# Patient Record
Sex: FEMALE | Race: WHITE | NOT HISPANIC OR LATINO | Employment: FULL TIME | URBAN - METROPOLITAN AREA
[De-identification: names, ages, dates, MRNs, and addresses within clinical notes are randomized per-mention and may not be internally consistent; named-entity substitution may affect disease eponyms.]

---

## 2018-12-13 ENCOUNTER — OFFICE VISIT (OUTPATIENT)
Dept: BEHAVIORAL HEALTH | Facility: CLINIC | Age: 28
End: 2018-12-13
Payer: COMMERCIAL

## 2018-12-13 DIAGNOSIS — F43.23 ADJUSTMENT DISORDER WITH MIXED ANXIETY AND DEPRESSED MOOD: ICD-10-CM

## 2018-12-13 PROCEDURE — 90791 PSYCH DIAGNOSTIC EVALUATION: CPT | Performed by: PSYCHOLOGIST

## 2018-12-13 NOTE — BH THERAPY
[facility]  INITIAL ASSESSMENT    Name: Lisy Jean Baptiste  MRN: [id number]  : 1990  Age: 28 y.o.  Date of assessment: 2018  PCP: Alan Muller M.D.  Persons in attendance: Patient  Total session time: 50 minutes  Confidentiality Reviewed    CHIEF COMPLAINT AND HISTORY OF PRESENTING PROBLEM:  (as stated by Patient): anxiety , I have a lump in  My throat  Lisy Jean Baptiste is a 28 y.o., White female referred for assessment by No ref. provider found.  Primary presenting issue includes anxiety and looking for coping skills    Client born and raised in [place] by mom and dad, dad since .   sibship and close with family.  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    With partner for 5 years, lived together 4 years and marrying in 2019.      Denies substance use, etoh is minimal, never tried illicit subs, denied legal entanglements.    MH hx is minimal, felt depressed two years ago and began Wellbutrin, feels pretty stable on same.  Just began EAP for anxiety as [facility] apt was way out there.  She is working with local therapist.   Kept this apt for addition coping skills.      Is working with MD to verify lump in her throat is not physical.  Been told might be related to anxiety.    Provided psycho ed in breathing and grounding, suggested gratitude journal.     FAMILY/SOCIAL HISTORY  Current living situation/household members: fiance   Relevant family history/structure/dynamics: traditiona, father passed  Current family/social stressors: work, wedding  Quality/quantity of current family and/or social support: good friends family, fiance  Does patient/parent report a family history of behavioral health issues, diagnoses, or treatment? No  No family history on file.     BEHAVIORAL HEALTH TREATMENT HISTORY  Does patient/parent report a history of prior behavioral health treatment for patient? No:    History of untreated  behavioral health issues identified? No    Psychometric Test Results:       MEDICAL HISTORY    Past medical/surgical history: are medical issues contributing to mental health concerns: jpossible thyroid issues?      Medication Allergies: see file  Patient has no allergy information on record.   Medical history provided by patient during current evaluation: not reviewed:  see file     Patient reports last physical exam: not reviewed: see file  Does patient/parent report any history of or current developmental concerns? No  Does patient/parent report nutritional concerns? No  Does patient/parent report change in appetite or weight loss/gain? No  Does patient/parent report history of eating disorder symptoms? No    EDUCATIONAL/LEARNING HISTORY  Is patient currently enrolled in a school/educational program?   No:   Highest grade level completed: 16  School performance/functioning: na  History of Special Education/repeated grades/learning issues: no  Preferred learning style: not reviewee  Current learning needs (large print, language barrier, etc):  n      EMPLOYMENT/RESOURCES  Is the patient currently employed? Yes  Does the patient/parent report adequate financial resources? Yes  Does patient identify impact of presenting issue on work functioning? No  Work or income-related stressors:  n       HISTORY:  Does patient report current or past enlistment? No    [If yes, complete below items]    SPIRITUAL/CULTURAL/IDENTITY:  What are the patient’s/family’s spiritual beliefs or practices? Not reviewed  What is the patient’s cultural or ethnic background/identity? Not reviewed  How does the patient identify their sexual orientation? hetero  How does the patient identify their gender? female  Does the patient identify any spiritual/cultural/identity factors as relevant to the presenting issue? No    LEGAL HISTORY  Has the patient ever been involved with juvenile, adult, or family legal systems? No   [If yes, trigger  section below:]  Does patient report ever being a victim of a crime?  No  Does patient report involvement in any current legal issues?  No      ABUSE/NEGLECT/TRAUMA SCREENING  Does patient report feeling “unsafe” in his/her home, or afraid of anyone? No  Does patient report any history of physical, sexual, or emotional abuse? No  Does parent or significant other report any of the above? No  Is there evidence of neglect by self? No  Is there evidence of neglect by a caregiver? No  Does the patient/parent report any history of CPS/APS/police involvement related to suspected abuse/neglect or domestic violence? No  Does the patient/parent report any other history of potentially traumatic life events? No       SAFETY ASSESSMENT - SELF  Does patient acknowledge current or past symptoms of dangerousness to self? No  Does parent/significant other report patient has current or past symptoms of dangerousness to self? No        Current Suicide Risk: Not applicable:     Crisis Safety Plan completed and copy given to patient: No    SAFETY ASSESSMENT - OTHERS  Does paor past symptoms of aggressive behavior or risk to others? No      Recent change in frequency/specificity/intensity of thoughts or threats to harm others? No      Current Homicide Risk:  Not applicable  Crisis Safety Plan completed and copy given to patient? No  Based on information provided during the current assessment, is a mandated “duty to warn” being exercised? No    SUBSTANCE USE/ADDICTION HISTORY  [] Not applicable - patient 10 years of age or younger    Is there a family history of substance use/addiction? No  Does patient acknowledge or parent/significant other report use of/dependence on substances? No  Last time patient used alcohol: 0  Within the past week? No  Last time patient used marijuana: 0  Within the past month? No  Any other street drugs ever tried even once? No  Any use of prescription medications/pills without a prescription, or for reasons  others than originally prescribed?  No  Any other addictive behavior reported (gambling, shopping, sex)? No     Drug History:  Amphetamine:  No existing history information found.No existing history information found.No existing history information found.    Cannibis:  No existing history information found.No existing history information found.No existing history information found.    Cocaine:  No existing history information found.No existing history information found.No existing history information found.    Ecstasy:  No existing history information found.No existing history information found.No existing history information found.    Hallucinogen:  No existing history information found.No existing history information found.No existing history information found.    Inhalant:   No existing history information found.No existing history information found.No existing history information found.    Opiate:  No existing history information found.No existing history information found.No existing history information found.    Other:  No existing history information found.No existing history information found.No existing history information found.    Sedative:   No existing history information found.No existing history information found.No existing history information found.        What consequences does the patient associate with any of the above substance use and or addictive behaviors? None    Patient’s motivation/readiness for change: na    [] Patient denies use of any substance/addictive behaviors    STRENGTHS/ASSETS  Strengths Identified by interviewer: Insight into problems, Self-awareness, Family suppport, Social support, Stable relationships and Optimism  Strengths Identified by patient: not reviewed    MENTAL STATUS/OBSERVATIONS   Participation: Active verbal participation  Grooming: Good  Orientation:Alert   Behavior: Calm  Eye contact: Good   Mood:Euthymic  Affect:Congruent with content, Anxious and  Happy  Thought process: Logical  Thought content:  Within normal limits  Speech: Rate within normal limits  Perception: Within normal limits  Memory: No gross evidence of memory deficits  Insight: Good  Judgment:  Good  Other:    Family/couple interaction observations: na      CLINICAL FORMULATION/WORKING HYPOTHESIS:  Client has a lot going on in her personal and professional life. Client likely would benefit from a  to assist in gaining skills to assist with work life.  She is working with a local therapist.  Client also looking for anxiety tools.  She will work with local therapist regarding processing events.  She will drop into [facility] when she is in town to continuebuilding skills.     DIAGNOSTIC IMPRESSION(S):  1. Adjustment disorder with mixed anxiety and depressed mood          IDENTIFIED NEEDS/PLAN:  [If any of these marked, trigger DISPOSITION list]  Mood/anxiety  Refer to [facility]: Outpatient Therapy    Does patient express agreement with the above plan? Yes     Referral appointment(s) scheduled? Yes       Mary Montoya, Ph.D.

## 2021-09-04 ENCOUNTER — OCCUPATIONAL MEDICINE (OUTPATIENT)
Dept: URGENT CARE | Facility: CLINIC | Age: 31
End: 2021-09-04
Payer: OTHER MISCELLANEOUS

## 2021-09-04 VITALS
BODY MASS INDEX: 42.84 KG/M2 | DIASTOLIC BLOOD PRESSURE: 70 MMHG | SYSTOLIC BLOOD PRESSURE: 126 MMHG | HEART RATE: 94 BPM | OXYGEN SATURATION: 98 % | WEIGHT: 241.8 LBS | TEMPERATURE: 97.4 F | HEIGHT: 63 IN | RESPIRATION RATE: 16 BRPM

## 2021-09-04 DIAGNOSIS — M79.605 LEFT LEG PAIN: ICD-10-CM

## 2021-09-04 DIAGNOSIS — S86.912A MUSCLE STRAIN OF LEFT LOWER LEG, INITIAL ENCOUNTER: ICD-10-CM
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
## 2021-09-04 PROCEDURE — 99204 OFFICE O/P NEW MOD 45 MIN: CPT | Performed by: NURSE PRACTITIONER

## 2021-09-04 RX ORDER — NAPROXEN 500 MG/1
500 TABLET ORAL 2 TIMES DAILY WITH MEALS
Qty: 30 TABLET | Refills: 0 | Status: SHIPPED | OUTPATIENT
Start: 2021-09-04 | End: 2021-09-17

## 2021-09-04 RX ORDER — LEVOTHYROXINE SODIUM 0.1 MG/1
100 TABLET ORAL
COMMUNITY

## 2021-09-04 RX ORDER — BUPROPION HYDROCHLORIDE 100 MG/1
100 TABLET ORAL 2 TIMES DAILY
COMMUNITY

## 2021-09-04 RX ORDER — KETOROLAC TROMETHAMINE 30 MG/ML
30 INJECTION, SOLUTION INTRAMUSCULAR; INTRAVENOUS ONCE
Status: COMPLETED | OUTPATIENT
Start: 2021-09-04 | End: 2021-09-04

## 2021-09-04 RX ADMIN — KETOROLAC TROMETHAMINE 30 MG: 30 INJECTION, SOLUTION INTRAMUSCULAR; INTRAVENOUS at 23:18

## 2021-09-04 NOTE — LETTER
[place] [address] [facility] [address] [address]  Phone:  [phone] - Fax:  [phone]   [facility] Progress Report and Disability Certification  Date of Service: 9/4/2021   No Show:  No  Date / Time of Next Visit: 9/9/2021    Claim Information   Patient Name: Lisy Jean Baptiste  Claim Number:     Employer:   [employer] Date of Injury: 9/4/2021     Insurer / TPA: [employer]  ID / SSN:     Occupation: Recreation Planner  Diagnosis: Diagnoses of Muscle strain of left lower leg, initial encounter, Left leg pain, and Work related injury were pertinent to this visit.    Medical Information   Related to Industrial Injury? Yes    Subjective Complaints:  DOI: 9/4/2021: Patient states that she was walking up a hill when she felt her left calf snap/pop and she fell to the ground.  Patient had immediate pain, however, got up and thought that she could continue to work and walk up the hill.  After a couple of steps, patient states her left leg pain increased significantly, causing her to fall.  Patient states at that time she began to feel slightly hot and lightheaded, however states that she was in direct sunlight for what felt like a long period of time, and had significant pain.  Patient states that since time of injury, she has tried to walk, however it increases pain in her left calf significantly.  Patient denies second job.  Patient denies prior injury to the left leg/calf.    PMH: No pertinent past medical history to this problem   MEDS: Medications were reviewed in Epic   ALLERGIES: Allergies were reviewed in Epic   SOCHX: Works in [employer]  FH: No pertinent family history to this problem          Objective Findings: MSK: Left leg has mild tenderness mid calf, no obvious visible deformities, step-offs, or bruising.  Patient is able to plantar flex without difficulty, however complains of pain with dorsiflexion.  Gait is antalgic, favors left leg.    Pre-Existing Condition(s): None   Assessment:   Initial Visit    Status: Additional Care Required  Permanent Disability:No    Plan: Medication    Diagnostics:      Comments:       Disability Information   Status: Released to Restricted Duty    From:  2021  Through: 2021 Restrictions are: Temporary   Physical Restrictions   Sitting:    Standing:  < or = to 2 hrs/day Stooping:    Bending:      Squattin hrs/day Walking:  < or = to 2 hrs/day  Comments:must walk with crutches Climbin hrs/day Pushing:      Pulling:    Other:    Reaching Above Shoulder (L):   Reaching Above Shoulder (R):       Reaching Below Shoulder (L):    Reaching Below Shoulder (R):      Not to exceed Weight Limits   Carrying(hrs):   Weight Limit(lb):   Lifting(hrs):   Weight  Limit(lb):     Comments: Patient must use crutches at all times, may toe-touch left leg/weight-bear as tolerated.    Repetitive Actions   Hands: i.e. Fine Manipulations from Grasping:     Feet: i.e. Operating Foot Controls:     Driving / Operate Machinery:     Health Care Provider’s Original or Electronic Signature  DIANE ClarkPPrinceRPrinceN. Health Care Provider’s Original or Electronic Signature    Anuel Amador MD       Clinic Name / Location: [address] [place] [address] Clinic Phone Number: Dept: [phone]   Appointment Time: 8:15 Pm Visit Start Time: 10:08 PM   Check-In Time:  8:54 Pm Visit Discharge Time:  1116 pm    Original-Treating Physician or Chiropractor    Page 2-Insurer/TPA    Page 3-Employer    Page 4-Employee

## 2021-09-04 NOTE — LETTER
"EMPLOYEE’S CLAIM FOR COMPENSATION/ REPORT OF INITIAL TREATMENT  FORM C-4    EMPLOYEE’S CLAIM - PROVIDE ALL INFORMATION REQUESTED   First Name  Lisy Last Name  Ita Birthdate                    1990                Sex  female Claim Number (Insurer’s Use Only)    Home Address  [address] Age  31 y.o. Height  1.6 m (5' 3\") Weight  110 kg (241 lb 12.8 oz) [facility]     [facility] Zip  [address] Telephone  [phone] (home)    Mailing Address  [address] [place]  [address] Primary Language Spoken  English    Insurer   Third-Party   [facility] 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 (Be specific an answer in detail. 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  na Witnesses to the Accident  Adeel rubin [facility]        Nature of Injury " or Occupational Disease  Strain  Part(s) of Body Injured or Affected  Lower Leg (L), ,     I certify that the above is true and correct to the best of my knowledge and that I have provided this information in order to obtain the benefits of [place]  I hereby authorize any physician, chiropractor, surgeon, practitioner, or other person, any hospital, including [facility] or [facility], any medical service organization, any insurance company, or other institution or organization to release to each other, any medical or other information, including benefits paid or payable, pertinent to this injury or disease, except information relative to diagnosis, treatment and/or counseling for AIDS, psychological conditions, alcohol or controlled substances, for which I must give specific authorization.  A Photostat of this authorization shall be as valid as the original.     Date   Place Employee’s Original or  *Electronic Signature   THIS REPORT MUST BE COMPLETED AND MAILED WITHIN 3 WORKING DAYS OF TREATMENT   Place  [facility]  Name of Facility  [facility]   Date  9/4/2021 Diagnosis and Description of Injury or Occupational Disease  (S86.912A) Muscle strain of left lower leg, initial encounter  (M79.605) Left leg pain  (Y99.0) Work related injury Is there evidence the injured employee was under the influence of alcohol and/or another controlled substance at the time of accident?  ? No ? Yes (if yes, please explain)    Hour  10:08 PM   Diagnoses of Muscle strain of left lower leg, initial encounter, Left leg pain, and Work related injury were pertinent to this visit. No   Treatment  RICE, crutches, medication, gentle ROM/stretching exercises  Have you advised the patient to remain off work five days or     more?    X-Ray Findings      ? Yes Indicate dates:   From   To      From information  "given by the employee, together with medical evidence, can        you directly connect this injury or occupational disease as job incurred?  Yes ? No If no, is the injured employee capable of:  ? full duty  No ? modified duty  Yes   Is additional medical care by a physician indicated?  Yes If Modified Duty, Specify any Limitations / Restrictions  See D39   Do you know of any previous injury or disease contributing to this condition or occupational disease?  ? Yes ? No (Explain if yes)                          No   Date  9/4/2021 Print Health Care Provider's   MIRIAN Clark I certify the employer’s copy of  this form was mailed on:   Address  [address] Insurer’s Use Only     [facility] [facility] Zip  [address]    Provider’s Tax ID Number  [id number] Telephone  Dept: [phone]             Health Care Provider’s Original or Electronic Signature  e-LADY CampbellRSTEFANO Degree (MD,DO, DC,PA-C,APRN)   APRN      * Complete and attach Release of Information (Form C-4A) when injured employee signs C-4 Form electronically  ORIGINAL - TREATING HEALTHCARE PROVIDER PAGE 2 - INSURER/TPA PAGE 3 - EMPLOYER PAGE 4 - EMPLOYEE             Form C-4 (rev.08/21)           BRIEF DESCRIPTION OF RIGHTS AND BENEFITS  (Pursuant to [place] 616C.050)    Notice of Injury or Occupational Disease (Incident Report Form C-1): If an injury or occupational disease (OD) arises out of and in the course of employment, you must provide written notice to your employer as soon as practicable, but no later than 7 days after the accident or OD. Your employer shall maintain a sufficient supply of the required forms.    Claim for Compensation (Form C-4): If medical treatment is sought, the form C-4 is available at the place of initial treatment. A completed \"Claim for Compensation\" (Form C-4) must be filed within 90 days after an accident or OD. The treating physician or chiropractor must, within 3 working days after " treatment, complete and mail to the employer, the employer's insurer and third-party , the Claim for Compensation.    Medical Treatment: If you require medical treatment for your on-the-job injury or OD, you may be required to select a physician or chiropractor from a list provided by your workers’ compensation insurer, if it has contracted with an Organization for Managed Care (MCO) or Preferred Provider Organization (PPO) or providers of health care. If your employer has not entered into a contract with an MCO or PPO, you may select a physician or chiropractor from the Panel of Physicians and Chiropractors. Any medical costs related to your industrial injury or OD will be paid by your insurer.    Temporary Total Disability (TTD): If your doctor has certified that you are unable to work for a period of at least 5 consecutive days, or 5 cumulative days in a 20-day period, or places restrictions on you that your employer does not accommodate, you may be entitled to TTD compensation.    Temporary Partial Disability (TPD): If the wage you receive upon reemployment is less than the compensation for TTD to which you are entitled, the insurer may be required to pay you TPD compensation to make up the difference. TPD can only be paid for a maximum of 24 months.    Permanent Partial Disability (PPD): When your medical condition is stable and there is an indication of a PPD as a result of your injury or OD, within 30 days, your insurer must arrange for an evaluation by a rating physician or chiropractor to determine the degree of your PPD. The amount of your PPD award depends on the date of injury, the results of the PPD evaluation, your age and wage.    Permanent Total Disability (PTD): If you are medically certified by a treating physician or chiropractor as permanently and totally disabled and have been granted a PTD status by your insurer, you are entitled to receive monthly benefits not to exceed 66 2/3% of  your average monthly wage. The amount of your PTD payments is subject to reduction if you previously received a lump-sum PPD award.    Vocational Rehabilitation Services: You may be eligible for vocational rehabilitation services if you are unable to return to the job due to a permanent physical impairment or permanent restrictions as a result of your injury or occupational disease.    Transportation and Per Brendan Reimbursement: You may be eligible for travel expenses and per brendan associated with medical treatment.    Reopening: You may be able to reopen your claim if your condition worsens after claim closure.     Appeal Process: If you disagree with a written determination issued by the insurer or the insurer does not respond to your request, you may appeal to the Department of Administration, , by following the instructions contained in your determination letter. You must appeal the determination within 70 days from the date of the determination letter at [address], [address], [place], [place] [address], or [address], [place] [address]. If you disagree with the  decision, you may appeal to the Department of Administration, . You must file your appeal within 30 days from the date of the  decision letter at [address], [address], [place], [place] [address], or [address], [address], [place], [place] [address]. If you disagree with a decision of an , you may file a petition for judicial review with the District Court. You must do so within 30 days of the Appeal Officer’s decision. You may be represented by an  at your own expense or you may contact the [facility] for possible representation.    [place]  for Injured Workers (NAIW): If you disagree with a  decision, you may request that NAIW represent you without charge at an  Hearing. For  information regarding denial of benefits, you may contact the [facility] at: [address], [address], [place] [address], (110) [phone], or [address], [address], [place] [address], [phone]) [phone]    To File a Complaint with the Division: If you wish to file a complaint with the  of the Division of Industrial Relations (DIR),  please contact the Workers’ Compensation Section, [address], [address], [place], [place] [address], telephone (850) [phone], or [address], [address], [place], [place] [address], telephone (641) [phone].    For assistance with Workers’ Compensation Issues: You may contact the [facility] Office for Consumer Health Assistance, [address], [place], [place] [address], Toll Free 1-925.181.6585, Web site: http://Critical access hospital.[place].gov/Programs/REZA E-mail: reza@[facility].nv.[facility]              __________________________________________________________________                                    _________________            Employee Name / Signature                                                                                                                            Date                                                                                                                                                                                                                              D-2 (rev. 10/20)

## 2021-09-06 ASSESSMENT — ENCOUNTER SYMPTOMS
CONSTITUTIONAL NEGATIVE: 1
GASTROINTESTINAL NEGATIVE: 1
NEUROLOGICAL NEGATIVE: 1
FALLS: 1
PSYCHIATRIC NEGATIVE: 1
EYES NEGATIVE: 1
MYALGIAS: 1
RESPIRATORY NEGATIVE: 1
CARDIOVASCULAR NEGATIVE: 1

## 2021-09-06 NOTE — PROGRESS NOTES
Subjective:   Lisy Mari is a 31 y.o. female who presents for Leg Injury (calf pain ( L) x today  pt stated it happened at work when she was hiking )  )    Leg Injury     DOI: 9/4/2021: Patient states that she was walking up a hill when she felt her left calf snap/pop and she fell to the ground.  Patient had immediate pain, however, got up and thought that she could continue to work and walk up the hill.  After a couple of steps, patient states her left leg pain increased significantly, causing her to fall.  Patient states at that time she began to feel slightly hot and lightheaded, however states that she was in direct sunlight for what felt like a long period of time, and had significant pain.  Patient states that since time of injury, she has tried to walk, however it increases pain in her left calf significantly.  Patient denies second job.  Patient denies prior injury to the left leg/calf.    PMH: No pertinent past medical history to this problem   MEDS: Medications were reviewed in Epic   ALLERGIES: Allergies were reviewed in Epic   SOCHX: Works in [employer]  FH: No pertinent family history to this problem     Review of Systems   Constitutional: Negative.    HENT: Negative.    Eyes: Negative.    Respiratory: Negative.    Cardiovascular: Negative.    Gastrointestinal: Negative.    Genitourinary: Negative.    Musculoskeletal: Positive for falls and myalgias (left calf).   Skin: Negative.    Neurological: Negative.    Psychiatric/Behavioral: Negative.    All other systems reviewed and are negative.         MEDS:   Current Outpatient Medications:   •  buPROPion (WELLBUTRIN) 100 MG Tab, Take 100 mg by mouth 2 times a day., Disp: , Rfl:   •  levothyroxine (SYNTHROID) 100 MCG Tab, Take 100 mcg by mouth every morning on an empty stomach., Disp: , Rfl:   •  naproxen (NAPROSYN) 500 MG Tab, Take 1 Tablet by mouth 2 times a day with meals., Disp: 30 Tablet, Rfl: 0  ALLERGIES:   Allergies   Allergen Reactions   • Sulfa  "Drugs        Patient's PMH, SocHx, SurgHx, FamHx, Drug allergies and medications were reviewed.     Objective:   /70 (BP Location: Left arm, Patient Position: Sitting, BP Cuff Size: Adult)   Pulse 94   Temp 36.3 °C (97.4 °F) (Temporal)   Resp 16   Ht 1.6 m (5' 3\")   Wt 110 kg (241 lb 12.8 oz)   SpO2 98%   BMI 42.83 kg/m²     Physical Exam  Vitals and nursing note reviewed.   Constitutional:       General: She is awake.      Appearance: Normal appearance. She is well-developed.   HENT:      Head: Normocephalic and atraumatic.      Right Ear: External ear normal.      Left Ear: External ear normal.      Nose: Nose normal.      Mouth/Throat:      Mouth: Mucous membranes are moist.      Pharynx: Oropharynx is clear.   Eyes:      Extraocular Movements: Extraocular movements intact.      Conjunctiva/sclera: Conjunctivae normal.   Cardiovascular:      Rate and Rhythm: Normal rate and regular rhythm.   Pulmonary:      Effort: Pulmonary effort is normal.      Breath sounds: Normal breath sounds.   Musculoskeletal:         General: Normal range of motion.      Cervical back: Normal range of motion and neck supple.      Comments: MSK: Left leg has mild tenderness mid calf, no obvious visible deformities, step-offs, or bruising.  Patient is able to plantar flex without difficulty, however complains of pain with dorsiflexion.  Gait is antalgic, favors left leg.   Skin:     General: Skin is warm and dry.   Neurological:      Mental Status: She is alert and oriented to person, place, and time.   Psychiatric:         Mood and Affect: Mood normal.         Behavior: Behavior normal.         Thought Content: Thought content normal.         Assessment/Plan:   Assessment    1. Muscle strain of left lower leg, initial encounter  - ketorolac (TORADOL) injection 30 mg  - naproxen (NAPROSYN) 500 MG Tab; Take 1 Tablet by mouth 2 times a day with meals.  Dispense: 30 Tablet; Refill: 0    2. Left leg pain  - naproxen (NAPROSYN) " 500 MG Tab; Take 1 Tablet by mouth 2 times a day with meals.  Dispense: 30 Tablet; Refill: 0    3. Work related injury    Other orders  - buPROPion (WELLBUTRIN) 100 MG Tab; Take 100 mg by mouth 2 times a day.  - levothyroxine (SYNTHROID) 100 MCG Tab; Take 100 mcg by mouth every morning on an empty stomach.    See D39.  Crutches provided in clinic, advised to toe touch as tolerated.  Medications as listed in clinic, take at home on a scheduled basis for the next few days, beginning tomorrow.  Gentle ROM and stretching exercises recommended.  F/u in 5 days, sooner with increasing symptoms.

## 2021-09-09 ENCOUNTER — OCCUPATIONAL MEDICINE (OUTPATIENT)
Dept: URGENT CARE | Facility: PHYSICIAN GROUP | Age: 31
End: 2021-09-09
Payer: OTHER MISCELLANEOUS

## 2021-09-09 VITALS
BODY MASS INDEX: 43.05 KG/M2 | WEIGHT: 243 LBS | RESPIRATION RATE: 18 BRPM | SYSTOLIC BLOOD PRESSURE: 130 MMHG | HEART RATE: 93 BPM | DIASTOLIC BLOOD PRESSURE: 74 MMHG | OXYGEN SATURATION: 97 % | HEIGHT: 63 IN | TEMPERATURE: 99.4 F

## 2021-09-09 DIAGNOSIS — S86.819D STRAIN OF CALF MUSCLE, SUBSEQUENT ENCOUNTER: ICD-10-CM

## 2021-09-09 DIAGNOSIS — S86.112D GASTROCNEMIUS MUSCLE TEAR, LEFT, SUBSEQUENT ENCOUNTER: ICD-10-CM

## 2021-09-09 PROCEDURE — 99213 OFFICE O/P EST LOW 20 MIN: CPT | Performed by: PHYSICIAN ASSISTANT

## 2021-09-09 NOTE — LETTER
"   [facility] [address] [place] [address]  Phone:  [phone] - Fax:  [phone]   [facility] Progress Report and Disability Certification  Date of Service: 9/9/2021   No Show:  No  Date / Time of Next Visit: 9/17/2021   Claim Information   Patient Name: Lisy Mari  Claim Number:     Employer:   [employer] Date of Injury: 9/4/2021     Insurer / TPA: [employer]  ID / SSN:     Occupation: Recreation Planner  Diagnosis: Diagnoses of Gastrocnemius muscle tear, left, subsequent encounter and Strain of calf muscle, subsequent encounter were pertinent to this visit.    Medical Information   Related to Industrial Injury? Yes    Subjective Complaints:  DOI: 9/4/2021: \"Patient states that she was walking up a hill when she felt her left calf snap/pop and she fell to the ground.  Patient had immediate pain, however, got up and thought that she could continue to work and walk up the hill.  After a couple of steps, patient states her left leg pain increased significantly, causing her to fall.  Patient states at that time she began to feel slightly hot and lightheaded, however states that she was in direct sunlight for what felt like a long period of time, and had significant pain.  Patient states that since time of injury, she has tried to walk, however it increases pain in her left calf significantly.  Patient denies second job.  Patient denies prior injury to the left leg/calf.\"    Patient presents today for first follow-up visit.  She reports her pain is improving.  She has been using the crutches as needed and weightbearing as tolerated.  She does report pain with dorsi flexion of her left foot.  There is mild bruising and swelling of her calf.  She is taking ibuprofen as needed.  Denies changes in sensation.   Objective Findings: Patient is generally well-appearing and in no acute distress.  A&O by 4.  MSK: Left calf-mild bruising over medial " aspect of the gastrocnemius muscle with tenderness to palpation.  Achilles tendon is intact.  Plantar and dorsiflexion of left foot.  Distal neurovascular intact.  No obvious muscular deformity noted.   Pre-Existing Condition(s):     Assessment:   Condition Improved    Status: Additional Care Required  Permanent Disability:No    Plan:   Comments:OTC ibuprofen as needed, take this medication with food.  Patient given specific PT exercises to do at home    Diagnostics:      Comments:       Disability Information   Status: Released to Restricted Duty    From:  9/9/2021  Through: 9/17/2021 Restrictions are: Temporary   Physical Restrictions   Sitting:    Standing:    Stooping:    Bending:      Squatting:    Walking:    Climbing:    Pushing:      Pulling:    Other:    Reaching Above Shoulder (L):   Reaching Above Shoulder (R):       Reaching Below Shoulder (L):    Reaching Below Shoulder (R):      Not to exceed Weight Limits   Carrying(hrs):   Weight Limit(lb):   Lifting(hrs):   Weight  Limit(lb):     Comments: -Weightbearing and ambulation as tolerated with pain.  -Continue to use crutches as needed.  -Recommend light duty until next follow-up on 9/17/2021.  -OTC ibuprofen as needed for pain and inflammation.  -Apply ice to affected area, rest, compression, and elevate.  -Patient given PT handout specific to injury  -Patient verbalized understanding of treatment plan and has no further questions regarding care.      Repetitive Actions   Hands: i.e. Fine Manipulations from Grasping:     Feet: i.e. Operating Foot Controls:     Driving / Operate Machinery:     Health Care Provider’s Original or Electronic Signature  Eliz Shipman P.A.-C. Health Care Provider’s Original or Electronic Signature    Anuel Amador MD       Clinic Name / Location: [facility] [address] [address] Clinic Phone Number: Dept: [phone]   Appointment Time: 1:00 Pm Visit Start Time: 1:12 PM   Check-In  Time:  12:48 Pm Visit Discharge Time: 2:18 PM   Original-Treating Physician or Chiropractor    Page 2-Insurer/TPA    Page 3-Employer    Page 4-Employee

## 2021-09-09 NOTE — PROGRESS NOTES
"Subjective     Lisy Mari is a 31 y.o. female who presents with No chief complaint on file.      DOI: 9/4/2021: \"Patient states that she was walking up a hill when she felt her left calf snap/pop and she fell to the ground.  Patient had immediate pain, however, got up and thought that she could continue to work and walk up the hill.  After a couple of steps, patient states her left leg pain increased significantly, causing her to fall.  Patient states at that time she began to feel slightly hot and lightheaded, however states that she was in direct sunlight for what felt like a long period of time, and had significant pain.  Patient states that since time of injury, she has tried to walk, however it increases pain in her left calf significantly.  Patient denies second job.  Patient denies prior injury to the left leg/calf.\"    Patient presents today for first follow-up visit.  She reports her pain is improving.  She has been using the crutches as needed and weightbearing as tolerated.  She does report pain with dorsi flexion of her left foot.  There is mild bruising and swelling of her calf.  She is taking ibuprofen as needed.  Denies changes in sensation.     HPI    ROS           Objective     /74 (BP Location: Left arm, Patient Position: Sitting, BP Cuff Size: Adult)   Pulse 93   Temp 37.4 °C (99.4 °F) (Temporal)   Resp 18   Ht 1.6 m (5' 3\")   Wt 110 kg (243 lb)   SpO2 97%   BMI 43.05 kg/m²      Physical Exam    Patient is generally well-appearing and in no acute distress.  A&O by 4.  MSK: Left calf-mild bruising over medial aspect of the gastrocnemius muscle with tenderness to palpation.  Achilles tendon is intact.  Plantar and dorsiflexion of left foot.  Distal neurovascular intact.  No obvious muscular deformity noted.                   Assessment & Plan        1. Gastrocnemius muscle tear, left, subsequent encounter    2. Strain of calf muscle, subsequent encounter    -Weightbearing and " ambulation as tolerated with pain.  -Continue to use crutches as needed.  -Recommend light duty until next follow-up on 9/17/2021.  -OTC ibuprofen as needed for pain and inflammation.  -Apply ice to affected area, rest, compression, and elevate.  -Patient given PT handout specific to injury  -Patient verbalized understanding of treatment plan and has no further questions regarding care.

## 2021-09-17 ENCOUNTER — OCCUPATIONAL MEDICINE (OUTPATIENT)
Dept: URGENT CARE | Facility: PHYSICIAN GROUP | Age: 31
End: 2021-09-17
Payer: OTHER MISCELLANEOUS

## 2021-09-17 VITALS
RESPIRATION RATE: 16 BRPM | SYSTOLIC BLOOD PRESSURE: 118 MMHG | OXYGEN SATURATION: 96 % | HEART RATE: 83 BPM | DIASTOLIC BLOOD PRESSURE: 68 MMHG | HEIGHT: 63 IN | WEIGHT: 243 LBS | TEMPERATURE: 98.5 F | BODY MASS INDEX: 43.05 KG/M2

## 2021-09-17 DIAGNOSIS — S86.112D GASTROCNEMIUS MUSCLE TEAR, LEFT, SUBSEQUENT ENCOUNTER: ICD-10-CM

## 2021-09-17 PROCEDURE — 99213 OFFICE O/P EST LOW 20 MIN: CPT | Performed by: NURSE PRACTITIONER

## 2021-09-17 RX ORDER — BUPROPION HYDROCHLORIDE 300 MG/1
1 TABLET ORAL DAILY
COMMUNITY
Start: 2021-09-01

## 2021-09-17 RX ORDER — LEVOTHYROXINE SODIUM 0.07 MG/1
1 TABLET ORAL DAILY
COMMUNITY
Start: 2021-07-27

## 2021-09-17 NOTE — PROGRESS NOTES
"Subjective     Lisy Mari is a 31 y.o. female who presents with Follow-Up (leg pain; left. )      DOI: 9/4/2021: \"Patient states that she was walking up a hill when she felt her left calf snap/pop and she fell to the ground.  Patient had immediate pain, however, got up and thought that she could continue to work and walk up the hill.  After a couple of steps, patient states her left leg pain increased significantly, causing her to fall.  Patient states at that time she began to feel slightly hot and lightheaded, however states that she was in direct sunlight for what felt like a long period of time, and had significant pain.  Patient states that since time of injury, she has tried to walk, however it increases pain in her left calf significantly.  Patient denies second job.  Patient denies prior injury to the left leg/calf.\"     Visit#3- 9/17/21: Pt reports almost 90% improvement since DOI. She reports she has not been using crutches for the last 2-3 days. There is no pain with walking, just stiffness in her calf sometimes. She states at her work right now it is mostly desk duty because of the current season they are in. She does not have any hiking to do in the near future. She is still performing the stretches provided to her at the last visit.     HPI    Review of Systems   Musculoskeletal:        Left calf injury   All other systems reviewed and are negative.         PMH: No pertinent past medical history to this problem  MEDS: Medications were reviewed in Epic  ALLERGIES: Allergies were reviewed in Epic  SOCHX: Works as a recreation planner at [employer]   FH: No pertinent family history to this problem        Objective     /68 (BP Location: Left arm, Patient Position: Sitting, BP Cuff Size: Adult)   Pulse 83   Temp 36.9 °C (98.5 °F) (Temporal)   Resp 16   Ht 1.6 m (5' 3\")   Wt 110 kg (243 lb)   SpO2 96%   BMI 43.05 kg/m²      Physical Exam  Vitals and nursing note reviewed.   Constitutional:     "   Appearance: Normal appearance. She is normal weight.   HENT:      Head: Normocephalic and atraumatic.      Nose: Nose normal.      Mouth/Throat:      Mouth: Mucous membranes are moist.      Pharynx: Oropharynx is clear.   Eyes:      Extraocular Movements: Extraocular movements intact.      Pupils: Pupils are equal, round, and reactive to light.   Cardiovascular:      Rate and Rhythm: Normal rate and regular rhythm.   Pulmonary:      Effort: Pulmonary effort is normal.   Musculoskeletal:         General: Normal range of motion.      Cervical back: Normal range of motion.   Skin:     General: Skin is warm and dry.      Capillary Refill: Capillary refill takes less than 2 seconds.   Neurological:      General: No focal deficit present.      Mental Status: She is alert and oriented to person, place, and time. Mental status is at baseline.   Psychiatric:         Mood and Affect: Mood normal.         Speech: Speech normal.         Thought Content: Thought content normal.         Judgment: Judgment normal.         MSK: Left calf- scant bruising over distal medial aspect of the gastrocnemius muscle with mild tenderness to palpation.  Achilles tendon is intact.  Plantar and dorsiflexion of left foot.  Distal neurovascular intact.  No obvious muscular deformity noted. Slightly stiff gait.                   Assessment & Plan        1. Gastrocnemius muscle tear, left, subsequent encounter    Full duty  Continue stretches/exercises  FV in 10 days, anticipate discharge MMI

## 2021-09-17 NOTE — LETTER
"   [address] [place] [address]  Phone:  [phone] - Fax:  [phone]   [facility] Progress Report and Disability Certification  Date of Service: 9/17/2021   No Show:  No  Date / Time of Next Visit: 9/27/2021   Claim Information   Patient Name: Lisy Mari  Claim Number:     Employer:   [employer] Date of Injury: 9/4/2021     Insurer / TPA: [facility] [employer]  ID / SSN:     Occupation: Recreation Planner  Diagnosis: The encounter diagnosis was Gastrocnemius muscle tear, left, subsequent encounter.    Medical Information   Related to Industrial Injury? Yes    Subjective Complaints:  DOI: 9/4/2021: \"Patient states that she was walking up a hill when she felt her left calf snap/pop and she fell to the ground.  Patient had immediate pain, however, got up and thought that she could continue to work and walk up the hill.  After a couple of steps, patient states her left leg pain increased significantly, causing her to fall.  Patient states at that time she began to feel slightly hot and lightheaded, however states that she was in direct sunlight for what felt like a long period of time, and had significant pain.  Patient states that since time of injury, she has tried to walk, however it increases pain in her left calf significantly.  Patient denies second job.  Patient denies prior injury to the left leg/calf.\"     Visit#3- 9/17/21: Pt reports almost 90% improvement since DOI. She reports she has not been using crutches for the last 2-3 days. There is no pain with walking, just stiffness in her calf sometimes. She states at her work right now it is mostly desk duty because of the current season they are in. She does not have any hiking to do in the near future. She is still performing the stretches provided to her at the last visit.   Objective Findings: MSK: Left calf- scant bruising over distal medial aspect of the gastrocnemius " muscle with mild tenderness to palpation.  Achilles tendon is intact.  Plantar and dorsiflexion of left foot.  Distal neurovascular intact.  No obvious muscular deformity noted. Slightly stiff gait.   Pre-Existing Condition(s):     Assessment:   Condition Improved    Status: Additional Care Required  Permanent Disability:No    Plan:      Diagnostics:      Comments:  Full duty  Continue stretches/exercises  FV in 10 days, anticipate discharge MMI    Disability Information   Status: Released to Full Duty    From:  9/17/2021  Through: 9/27/2021 Restrictions are:     Physical Restrictions   Sitting:    Standing:    Stooping:    Bending:      Squatting:    Walking:    Climbing:    Pushing:      Pulling:    Other:    Reaching Above Shoulder (L):   Reaching Above Shoulder (R):       Reaching Below Shoulder (L):    Reaching Below Shoulder (R):      Not to exceed Weight Limits   Carrying(hrs):   Weight Limit(lb):   Lifting(hrs):   Weight  Limit(lb):     Comments:      Repetitive Actions   Hands: i.e. Fine Manipulations from Grasping:     Feet: i.e. Operating Foot Controls:     Driving / Operate Machinery:     Health Care Provider’s Original or Electronic Signature  ADI OlivarezRPrinceN. Health Care Provider’s Original or Electronic Signature    Anuel Amador MD         Clinic Name / Location: [facility] [address] [address] Clinic Phone Number: Dept: [phone]   Appointment Time: 1:00 Pm Visit Start Time: 1:00 PM   Check-In Time:  12:50 Pm Visit Discharge Time: 1:45 Pm    Original-Treating Physician or Chiropractor    Page 2-Insurer/TPA    Page 3-Employer    Page 4-Employee

## 2021-09-24 ENCOUNTER — OCCUPATIONAL MEDICINE (OUTPATIENT)
Dept: URGENT CARE | Facility: PHYSICIAN GROUP | Age: 31
End: 2021-09-24
Payer: OTHER MISCELLANEOUS

## 2021-09-24 VITALS
HEART RATE: 84 BPM | DIASTOLIC BLOOD PRESSURE: 76 MMHG | SYSTOLIC BLOOD PRESSURE: 112 MMHG | HEIGHT: 64 IN | WEIGHT: 243 LBS | RESPIRATION RATE: 16 BRPM | BODY MASS INDEX: 41.48 KG/M2 | OXYGEN SATURATION: 96 % | TEMPERATURE: 98 F

## 2021-09-24 DIAGNOSIS — S86.112D GASTROCNEMIUS MUSCLE TEAR, LEFT, SUBSEQUENT ENCOUNTER: ICD-10-CM

## 2021-09-24 PROCEDURE — 99213 OFFICE O/P EST LOW 20 MIN: CPT | Performed by: PHYSICIAN ASSISTANT

## 2021-09-24 NOTE — PROGRESS NOTES
"Subjective:   Lisy Mari is a 31 y.o. female who presents for Follow-Up (WC DOI 9/4, left calf pain, no new concerns )          Date of injury 9/4/2021.  Visit #4.  Patient reports near complete resolution of her symptoms today.  She states that she is able to flex and extend her foot normally and without pain.  She is not requiring use of crutches to ambulate and she is not experiencing any pain with walking.  She is performing stretches as instructed and states that these are helping.  She has no additional concerns today and feels that she is ready to be discharged and return to full duty.      ROS    PMH:  has no past medical history on file.  MEDS:   Current Outpatient Medications:   •  buPROPion (WELLBUTRIN XL) 300 MG XL tablet, Take 1 Tablet by mouth every day., Disp: , Rfl:   •  levothyroxine (SYNTHROID) 75 MCG Tab, Take 1 Tablet by mouth every day., Disp: , Rfl:   •  buPROPion (WELLBUTRIN) 100 MG Tab, Take 100 mg by mouth 2 times a day., Disp: , Rfl:   •  levothyroxine (SYNTHROID) 100 MCG Tab, Take 100 mcg by mouth every morning on an empty stomach., Disp: , Rfl:   ALLERGIES:   Allergies   Allergen Reactions   • Sulfa Drugs      SURGHX: No past surgical history on file.  SOCHX:  reports that she has never smoked. She has never used smokeless tobacco. She reports previous alcohol use. She reports that she does not use drugs.  FH: Family history was reviewed, no pertinent findings to report   Objective:   /76 (BP Location: Left arm, Patient Position: Sitting, BP Cuff Size: Adult)   Pulse 84   Temp 36.7 °C (98 °F) (Temporal)   Resp 16   Ht 1.626 m (5' 4\")   Wt 110 kg (243 lb)   SpO2 96%   BMI 41.71 kg/m²   Physical Exam  Musculoskeletal:      Comments: Left lower extremity.  Appearance: Atraumatic.  No erythema or edema.  Palpation: No tenderness with palpation.  No palpable defects or deformities.  Range of motion: Plantar and dorsiflexion within normal limits.  Strength: Resisted " plantar and dorsiflexion 5 out of 5.  Neurovascular: Dorsalis pedis pulse 2+.  Sensation intact.           Assessment/Plan:   1. Gastrocnemius muscle tear, left, subsequent encounter    Discussed some additional stretching techniques.  Patient discharged.  Return precautions reviewed.    Differential diagnosis, natural history, supportive care, and indications for immediate follow-up discussed.

## 2021-09-24 NOTE — LETTER
[address] [address]  Phone:  [phone] - Fax:  [phone]   [facility] Progress Report and Disability Certification  Date of Service: 9/24/2021   No Show:  No  Date / Time of Next Visit:     Claim Information   Patient Name: Lisy Mari  Claim Number:     Employer:   [employer] Date of Injury: 9/4/2021     Insurer / TPA: [employer]  ID / SSN:     Occupation: Recreation Planner  Diagnosis: The encounter diagnosis was Gastrocnemius muscle tear, left, subsequent encounter.    Medical Information   Related to Industrial Injury? Yes    Subjective Complaints:  Date of injury 9/4/2021.  Visit #4.  Patient reports near complete resolution of her symptoms today.  She states that she is able to flex and extend her foot normally and without pain.  She is not requiring use of crutches to ambulate and she is not experiencing any pain with walking.  She is performing stretches as instructed and states that these are helping.  She has no additional concerns today and feels that she is ready to be discharged and return to full duty.   Objective Findings: Left lower extremity.  Appearance: Atraumatic.  No erythema or edema.  Palpation: No tenderness with palpation.  No palpable defects or deformities.  Range of motion: Plantar and dorsiflexion within normal limits.  Strength: Resisted plantar and dorsiflexion 5 out of 5.  Neurovascular: Dorsalis pedis pulse 2+.  Sensation intact.   Pre-Existing Condition(s):     Assessment:   Condition Improved    Status: Discharged /  MMI  Permanent Disability:No    Plan:      Diagnostics:      Comments:  Continue at home stretching.    Disability Information   Status: Released to Full Duty    From:     Through:   Restrictions are:     Physical Restrictions   Sitting:    Standing:    Stooping:    Bending:      Squatting:    Walking:    Climbing:    Pushing:      Pulling:    Other:     Reaching Above Shoulder (L):   Reaching Above Shoulder (R):       Reaching Below Shoulder (L):    Reaching Below Shoulder (R):      Not to exceed Weight Limits   Carrying(hrs):   Weight Limit(lb):   Lifting(hrs):   Weight  Limit(lb):     Comments:      Repetitive Actions   Hands: i.e. Fine Manipulations from Grasping:     Feet: i.e. Operating Foot Controls:     Driving / Operate Machinery:     Health Care Provider’s Original or Electronic Signature  Didier Olivas P.A.-C. Health Care Provider’s Original or Electronic Signature    Anuel Amador MD         Clinic Name / Location: [address] [address] Clinic Phone Number: Dept: [phone]   Appointment Time: 11:00 Am Visit Start Time: 11:08 AM   Check-In Time:  10:58 Am Visit Discharge Time:  11:59 AM   Original-Treating Physician or Chiropractor    Page 2-Insurer/TPA    Page 3-Employer    Page 4-Employee

## 2024-06-18 DIAGNOSIS — D50.9 IRON DEFICIENCY ANEMIA, UNSPECIFIED IRON DEFICIENCY ANEMIA TYPE: ICD-10-CM

## 2024-06-18 RX ORDER — METHYLPREDNISOLONE SODIUM SUCCINATE 125 MG/2ML
125 INJECTION, POWDER, LYOPHILIZED, FOR SOLUTION INTRAMUSCULAR; INTRAVENOUS PRN
OUTPATIENT
Start: 2024-06-19

## 2024-06-18 RX ORDER — DIPHENHYDRAMINE HYDROCHLORIDE 50 MG/ML
50 INJECTION INTRAMUSCULAR; INTRAVENOUS PRN
OUTPATIENT
Start: 2024-06-19

## 2024-06-18 RX ORDER — EPINEPHRINE 1 MG/ML(1)
0.5 AMPUL (ML) INJECTION PRN
OUTPATIENT
Start: 2024-06-19

## 2024-06-18 RX ORDER — SODIUM CHLORIDE 9 MG/ML
INJECTION, SOLUTION INTRAVENOUS CONTINUOUS
OUTPATIENT
Start: 2024-06-19

## 2024-07-05 ENCOUNTER — OUTPATIENT INFUSION SERVICES (OUTPATIENT)
Dept: ONCOLOGY | Facility: MEDICAL CENTER | Age: 34
End: 2024-07-05
Attending: OBSTETRICS & GYNECOLOGY
Payer: COMMERCIAL

## 2024-07-05 VITALS
HEART RATE: 86 BPM | RESPIRATION RATE: 18 BRPM | SYSTOLIC BLOOD PRESSURE: 124 MMHG | WEIGHT: 253.09 LBS | HEIGHT: 63 IN | DIASTOLIC BLOOD PRESSURE: 68 MMHG | BODY MASS INDEX: 44.84 KG/M2 | OXYGEN SATURATION: 98 % | TEMPERATURE: 98 F

## 2024-07-05 DIAGNOSIS — D50.9 IRON DEFICIENCY ANEMIA, UNSPECIFIED IRON DEFICIENCY ANEMIA TYPE: ICD-10-CM

## 2024-07-05 PROCEDURE — 700105 HCHG RX REV CODE 258: Performed by: OBSTETRICS & GYNECOLOGY

## 2024-07-05 PROCEDURE — 96365 THER/PROPH/DIAG IV INF INIT: CPT

## 2024-07-05 PROCEDURE — 700111 HCHG RX REV CODE 636 W/ 250 OVERRIDE (IP): Mod: JZ | Performed by: OBSTETRICS & GYNECOLOGY

## 2024-07-05 PROCEDURE — 96375 TX/PRO/DX INJ NEW DRUG ADDON: CPT

## 2024-07-05 RX ORDER — METHYLPREDNISOLONE SODIUM SUCCINATE 125 MG/2ML
125 INJECTION, POWDER, LYOPHILIZED, FOR SOLUTION INTRAMUSCULAR; INTRAVENOUS PRN
Status: DISCONTINUED | OUTPATIENT
Start: 2024-07-05 | End: 2024-07-05 | Stop reason: HOSPADM

## 2024-07-05 RX ORDER — DIPHENHYDRAMINE HYDROCHLORIDE 50 MG/ML
50 INJECTION INTRAMUSCULAR; INTRAVENOUS PRN
OUTPATIENT
Start: 2024-07-05

## 2024-07-05 RX ORDER — EPINEPHRINE 1 MG/ML(1)
0.5 AMPUL (ML) INJECTION PRN
OUTPATIENT
Start: 2024-07-05

## 2024-07-05 RX ORDER — SODIUM CHLORIDE 9 MG/ML
INJECTION, SOLUTION INTRAVENOUS CONTINUOUS
Status: DISCONTINUED | OUTPATIENT
Start: 2024-07-05 | End: 2024-07-05 | Stop reason: HOSPADM

## 2024-07-05 RX ORDER — SODIUM CHLORIDE 9 MG/ML
INJECTION, SOLUTION INTRAVENOUS CONTINUOUS
Status: CANCELLED | OUTPATIENT
Start: 2024-07-05

## 2024-07-05 RX ORDER — METHYLPREDNISOLONE SODIUM SUCCINATE 125 MG/2ML
125 INJECTION, POWDER, LYOPHILIZED, FOR SOLUTION INTRAMUSCULAR; INTRAVENOUS PRN
Status: CANCELLED | OUTPATIENT
Start: 2024-07-05

## 2024-07-05 RX ORDER — METHYLPREDNISOLONE SODIUM SUCCINATE 125 MG/2ML
125 INJECTION, POWDER, LYOPHILIZED, FOR SOLUTION INTRAMUSCULAR; INTRAVENOUS PRN
OUTPATIENT
Start: 2024-07-05

## 2024-07-05 RX ADMIN — SODIUM CHLORIDE 1000 MG: 9 INJECTION, SOLUTION INTRAVENOUS at 15:16

## 2024-07-05 RX ADMIN — METHYLPREDNISOLONE SODIUM SUCCINATE 125 MG: 125 INJECTION, POWDER, FOR SOLUTION INTRAMUSCULAR; INTRAVENOUS at 14:54
